# Patient Record
Sex: MALE | Race: WHITE | NOT HISPANIC OR LATINO
[De-identification: names, ages, dates, MRNs, and addresses within clinical notes are randomized per-mention and may not be internally consistent; named-entity substitution may affect disease eponyms.]

---

## 2017-01-31 PROBLEM — Z00.00 ENCOUNTER FOR PREVENTIVE HEALTH EXAMINATION: Status: ACTIVE | Noted: 2017-01-31

## 2017-03-01 ENCOUNTER — APPOINTMENT (OUTPATIENT)
Dept: CARDIOLOGY | Facility: CLINIC | Age: 82
End: 2017-03-01

## 2017-09-12 ENCOUNTER — APPOINTMENT (OUTPATIENT)
Dept: CARDIOLOGY | Facility: CLINIC | Age: 82
End: 2017-09-12
Payer: MEDICARE

## 2017-09-12 PROCEDURE — 93880 EXTRACRANIAL BILAT STUDY: CPT

## 2017-09-12 PROCEDURE — 93306 TTE W/DOPPLER COMPLETE: CPT

## 2017-09-14 PROCEDURE — 93224 XTRNL ECG REC UP TO 48 HRS: CPT

## 2017-09-19 ENCOUNTER — APPOINTMENT (OUTPATIENT)
Dept: CARDIOLOGY | Facility: CLINIC | Age: 82
End: 2017-09-19
Payer: MEDICARE

## 2017-09-19 PROCEDURE — 99214 OFFICE O/P EST MOD 30 MIN: CPT

## 2017-09-19 PROCEDURE — 93000 ELECTROCARDIOGRAM COMPLETE: CPT

## 2018-02-13 ENCOUNTER — RECORD ABSTRACTING (OUTPATIENT)
Age: 83
End: 2018-02-13

## 2018-02-14 ENCOUNTER — APPOINTMENT (OUTPATIENT)
Dept: CARDIOLOGY | Facility: CLINIC | Age: 83
End: 2018-02-14
Payer: MEDICARE

## 2018-02-14 ENCOUNTER — NON-APPOINTMENT (OUTPATIENT)
Age: 83
End: 2018-02-14

## 2018-02-14 VITALS
HEIGHT: 68 IN | SYSTOLIC BLOOD PRESSURE: 110 MMHG | DIASTOLIC BLOOD PRESSURE: 70 MMHG | HEART RATE: 54 BPM | WEIGHT: 170 LBS | OXYGEN SATURATION: 98 % | BODY MASS INDEX: 25.76 KG/M2

## 2018-02-14 DIAGNOSIS — Z86.39 PERSONAL HISTORY OF OTHER ENDOCRINE, NUTRITIONAL AND METABOLIC DISEASE: ICD-10-CM

## 2018-02-14 DIAGNOSIS — Z86.79 PERSONAL HISTORY OF OTHER DISEASES OF THE CIRCULATORY SYSTEM: ICD-10-CM

## 2018-02-14 PROCEDURE — 99214 OFFICE O/P EST MOD 30 MIN: CPT

## 2018-02-14 PROCEDURE — 93000 ELECTROCARDIOGRAM COMPLETE: CPT

## 2018-02-14 RX ORDER — METHYLPREDNISOLONE 4 MG/1
4 TABLET ORAL
Qty: 21 | Refills: 0 | Status: DISCONTINUED | COMMUNITY
Start: 2018-01-23

## 2018-02-28 ENCOUNTER — APPOINTMENT (OUTPATIENT)
Dept: CARDIOLOGY | Facility: CLINIC | Age: 83
End: 2018-02-28
Payer: MEDICARE

## 2018-02-28 VITALS
HEIGHT: 68 IN | DIASTOLIC BLOOD PRESSURE: 50 MMHG | WEIGHT: 170 LBS | BODY MASS INDEX: 25.76 KG/M2 | HEART RATE: 54 BPM | SYSTOLIC BLOOD PRESSURE: 100 MMHG

## 2018-02-28 PROCEDURE — 93306 TTE W/DOPPLER COMPLETE: CPT

## 2018-02-28 PROCEDURE — 93880 EXTRACRANIAL BILAT STUDY: CPT

## 2018-02-28 PROCEDURE — 99214 OFFICE O/P EST MOD 30 MIN: CPT

## 2018-03-05 ENCOUNTER — RX RENEWAL (OUTPATIENT)
Age: 83
End: 2018-03-05

## 2018-03-06 ENCOUNTER — APPOINTMENT (OUTPATIENT)
Dept: ELECTROPHYSIOLOGY | Facility: CLINIC | Age: 83
End: 2018-03-06

## 2018-04-04 ENCOUNTER — APPOINTMENT (OUTPATIENT)
Dept: CARDIOLOGY | Facility: CLINIC | Age: 83
End: 2018-04-04

## 2018-04-18 ENCOUNTER — APPOINTMENT (OUTPATIENT)
Age: 83
End: 2018-04-18

## 2018-06-06 ENCOUNTER — APPOINTMENT (OUTPATIENT)
Dept: CARDIOLOGY | Facility: CLINIC | Age: 83
End: 2018-06-06
Payer: MEDICARE

## 2018-06-06 ENCOUNTER — RECORD ABSTRACTING (OUTPATIENT)
Age: 83
End: 2018-06-06

## 2018-06-06 VITALS
HEART RATE: 60 BPM | DIASTOLIC BLOOD PRESSURE: 64 MMHG | SYSTOLIC BLOOD PRESSURE: 110 MMHG | HEIGHT: 68 IN | WEIGHT: 172 LBS | OXYGEN SATURATION: 97 % | BODY MASS INDEX: 26.07 KG/M2

## 2018-06-06 DIAGNOSIS — K76.89 OTHER SPECIFIED DISEASES OF LIVER: ICD-10-CM

## 2018-06-06 PROCEDURE — 93000 ELECTROCARDIOGRAM COMPLETE: CPT

## 2018-06-06 PROCEDURE — 99214 OFFICE O/P EST MOD 30 MIN: CPT

## 2018-09-11 ENCOUNTER — RECORD ABSTRACTING (OUTPATIENT)
Age: 83
End: 2018-09-11

## 2018-09-12 ENCOUNTER — APPOINTMENT (OUTPATIENT)
Dept: CARDIOLOGY | Facility: CLINIC | Age: 83
End: 2018-09-12
Payer: MEDICARE

## 2018-09-12 ENCOUNTER — NON-APPOINTMENT (OUTPATIENT)
Age: 83
End: 2018-09-12

## 2018-09-12 VITALS
HEART RATE: 60 BPM | BODY MASS INDEX: 26.98 KG/M2 | OXYGEN SATURATION: 95 % | SYSTOLIC BLOOD PRESSURE: 114 MMHG | DIASTOLIC BLOOD PRESSURE: 70 MMHG | HEIGHT: 68 IN | WEIGHT: 178 LBS

## 2018-09-12 PROCEDURE — 99214 OFFICE O/P EST MOD 30 MIN: CPT

## 2018-09-12 PROCEDURE — 93000 ELECTROCARDIOGRAM COMPLETE: CPT

## 2019-03-12 ENCOUNTER — RECORD ABSTRACTING (OUTPATIENT)
Age: 84
End: 2019-03-12

## 2019-03-12 ENCOUNTER — APPOINTMENT (OUTPATIENT)
Dept: CARDIOLOGY | Facility: CLINIC | Age: 84
End: 2019-03-12
Payer: MEDICARE

## 2019-03-12 PROCEDURE — 93880 EXTRACRANIAL BILAT STUDY: CPT

## 2019-03-12 PROCEDURE — 93306 TTE W/DOPPLER COMPLETE: CPT

## 2019-03-20 ENCOUNTER — APPOINTMENT (OUTPATIENT)
Dept: CARDIOLOGY | Facility: CLINIC | Age: 84
End: 2019-03-20
Payer: MEDICARE

## 2019-03-20 ENCOUNTER — MEDICATION RENEWAL (OUTPATIENT)
Age: 84
End: 2019-03-20

## 2019-03-20 VITALS
BODY MASS INDEX: 26.07 KG/M2 | DIASTOLIC BLOOD PRESSURE: 64 MMHG | WEIGHT: 172 LBS | HEART RATE: 62 BPM | HEIGHT: 68 IN | OXYGEN SATURATION: 97 % | SYSTOLIC BLOOD PRESSURE: 110 MMHG

## 2019-03-20 PROCEDURE — 93000 ELECTROCARDIOGRAM COMPLETE: CPT

## 2019-03-20 PROCEDURE — 99214 OFFICE O/P EST MOD 30 MIN: CPT

## 2019-03-20 NOTE — PHYSICAL EXAM
[Normal Appearance] : normal appearance [Eyelids - No Xanthelasma] : the eyelids demonstrated no xanthelasmas [No Oral Pallor] : no oral pallor [No Jugular Venous Alba A Waves] : no jugular venous alba A waves [Normal Jugular Venous A Waves Present] : normal jugular venous A waves present [Normal Jugular Venous V Waves Present] : normal jugular venous V waves present [Respiration, Rhythm And Depth] : normal respiratory rhythm and effort [Heart Sounds] : normal S1 and S2 [Heart Rate And Rhythm] : heart rate and rhythm were normal [Bowel Sounds] : normal bowel sounds [Abdomen Soft] : soft [Petechial Hemorrhages (___cm)] : no petechial hemorrhages [Abnormal Walk] : normal gait [] : no rash [Oriented To Time, Place, And Person] : oriented to person, place, and time

## 2019-03-20 NOTE — PHYSICAL EXAM
[Normal Appearance] : normal appearance [Eyelids - No Xanthelasma] : the eyelids demonstrated no xanthelasmas [No Oral Pallor] : no oral pallor [No Jugular Venous Alba A Waves] : no jugular venous alba A waves [Normal Jugular Venous V Waves Present] : normal jugular venous V waves present [Normal Jugular Venous A Waves Present] : normal jugular venous A waves present [Respiration, Rhythm And Depth] : normal respiratory rhythm and effort [Heart Rate And Rhythm] : heart rate and rhythm were normal [Heart Sounds] : normal S1 and S2 [Bowel Sounds] : normal bowel sounds [Abdomen Soft] : soft [Abnormal Walk] : normal gait [Petechial Hemorrhages (___cm)] : no petechial hemorrhages [] : no rash [Oriented To Time, Place, And Person] : oriented to person, place, and time

## 2019-03-22 NOTE — HISTORY OF PRESENT ILLNESS
[FreeTextEntry1] : I saw Rudolph today.  He is feeling better.  He had pain in the right shoulder, and there were at least two points where the pain was severe, and you had given him the injection at the site and his pain is much better.  He is going to call you to see if he should wear a sling.  He is otherwise feeling well.  \par He had minimal imbalance and TIA-like symptoms.  No orthopnea or PND.  No fever or chills.  \par He has mitral and tricuspid valve regurgitation.  We will get an echocardiogram sometime next spring and also check his carotids to make sure there is no worsening of the severe nonobstructive disease.  He is going to have blood work with you sometime in the beginning of the year.  \par We should periodically check his plasma potassium, magnesium, and thyroid profile.  \par He is very aware of low-cholesterol balanced diet.  He will call us if there are any new symptoms.  His cardiogram was sinus rhythm and within normal limits.\par \par \par On calling the hospital, there were no EKGs that showed atrial flutter or fibrillation.  The patient states that he was told that cardiology had not seen the patient and subsequently the cardiologist had read the EKG as sinus rhythm with PACs.  We discussed possible anticoagulation.  The patient irefuses to go on any medicines such as Eliquis, Coumadin, etc.  \par The patient is on low-dose aspirin and Plavix already.  So, we will wait till sinus rhythm and symptoms. He bruises very easily. We will continue present medical regimen.  So if he has any palpitations or any similar symptoms, he will call us or come to the emergency room.  We will see him in early fall.\par \par SAVE NOTE:\par  He has been doing well and suddenly on February 10, 2018, he states that he suddenly felt slightly dizzy, weak, and generalized weakness.  So he called 911.  He went to the hospital by ambulance.  In the ER, he did have atrial flutter fibrillation according to the discharge summary and he was started on metoprolol and Apixaban and placed on observation.  He had TIA without any residual with right-sided weakness seven years ago.  He has been on baby aspirin and Plavix since then.  He was monitored overnight after presenting with dizziness and found to have new onset atrial flutter fibrillation.  He had no further episodes of anything that looked like atrial fibrillation and in retrospect the initial EKG it had been read as atrial fibrillation with rapid ventricular response was read by cardiologist as sinus rhythm with PACs.  There was no confirmed EKG that shows atrial fibrillation and is not clear whether the patient truly had it.  He did certainly have some PACs.  He might have had some runs of atrial tachycardia in the emergency room.  Cardiology had not seen the patient.  \par He has history of hypertension and hypercholesterolemia for which he is on low dose aspirin, Plavix, and Crestor 2.5 mg once a day.  The chest x-ray was clear bilaterally.  The costophrenic angles were clear and the pulmonary structures were normal.  \par He had a CAT scan of the brain that showed no cerebral intracerebral hemorrhage or shift.  No extra axial calcification.  No mass effect.  No midline shift.  Mild to moderate atrophy.  Old lacunar infarcts in the left internal capsule.  \par On reviewing the EKG, there is no obvious atrial fibrillation.  There are sequential PACs and on the information reviewed, I do not see any fast irregular heartbeat.  They will also have and see electrophysiologist Dr. Silverman.  The patient states he had a CAT scan but we get Doppler carotids and repeat the echocardiogram.  He has no shortness of breath and no chest pain.  \par He has history of CVA as mentioned earlier seven years ago without residual.  He states as soon as the police after first responding gave him oxygen, he felt better and there was no residual issue.  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 Chase at [place].  \par He on echocardiogram done recently had minimum mitral and mild tricuspid valve regurgitation and mild pulmonic regurgitation, normal LVEF, and no CHF. 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  He was checked for tick borne diseases etc. in the fall and they were all negative.  \par He denies any chest pain and any shortness of breath.  He feels like before quite asymptomatic.  He will call us if there are any new symptoms. \par  He is aware of low cholesterol and low salt balanced diet and continued regular walking exercise program.

## 2019-03-22 NOTE — DISCUSSION/SUMMARY
[FreeTextEntry1] : \par I saw Rudolph today.  He is feeling better.  He had pain in the right shoulder, and there were at least two points where the pain was severe, and you had given him the injection at the site and his pain is much better.  He is going to call you to see if he should wear a sling.  He is otherwise feeling well.  \par He had minimal imbalance and TIA-like symptoms.  No orthopnea or PND.  No fever or chills.  \par He has mitral and tricuspid valve regurgitation.  We will get an echocardiogram sometime next spring and also check his carotids to make sure there is no worsening of the severe nonobstructive disease.  He is going to have blood work with you sometime in the beginning of the year.  \par We should periodically check his plasma potassium, magnesium, and thyroid profile.  \par He is very aware of low-cholesterol balanced diet.  He will call us if there are any new symptoms.  His cardiogram was sinus rhythm and within normal limits.\par \par \par On calling the hospital, there were no EKGs that showed atrial flutter or fibrillation.  The patient states that he was told that cardiology had not seen the patient and subsequently the cardiologist had read the EKG as sinus rhythm with PACs.  We discussed possible anticoagulation.  The patient irefuses to go on any medicines such as Eliquis, Coumadin, etc.  \par The patient is on low-dose aspirin and Plavix already.  So, we will wait till sinus rhythm and symptoms. He bruises very easily. We will continue present medical regimen.  So if he has any palpitations or any similar symptoms, he will call us or come to the emergency room.  We will see him in early fall.\par \par SAVE NOTE:\par  He has been doing well and suddenly on February 10, 2018, he states that he suddenly felt slightly dizzy, weak, and generalized weakness.  So he called 911.  He went to the hospital by ambulance.  In the ER, he did have atrial flutter fibrillation according to the discharge summary and he was started on metoprolol and Apixaban and placed on observation.  He had TIA without any residual with right-sided weakness seven years ago.  He has been on baby aspirin and Plavix since then.  He was monitored overnight after presenting with dizziness and found to have new onset atrial flutter fibrillation.  He had no further episodes of anything that looked like atrial fibrillation and in retrospect the initial EKG it had been read as atrial fibrillation with rapid ventricular response was read by cardiologist as sinus rhythm with PACs.  There was no confirmed EKG that shows atrial fibrillation and is not clear whether the patient truly had it.  He did certainly have some PACs.  He might have had some runs of atrial tachycardia in the emergency room.  Cardiology had not seen the patient.  \par He has history of hypertension and hypercholesterolemia for which he is on low dose aspirin, Plavix, and Crestor 2.5 mg once a day.  The chest x-ray was clear bilaterally.  The costophrenic angles were clear and the pulmonary structures were normal.  \par He had a CAT scan of the brain that showed no cerebral intracerebral hemorrhage or shift.  No extra axial calcification.  No mass effect.  No midline shift.  Mild to moderate atrophy.  Old lacunar infarcts in the left internal capsule.  \par On reviewing the EKG, there is no obvious atrial fibrillation.  There are sequential PACs and on the information reviewed, I do not see any fast irregular heartbeat.  They will also have and see electrophysiologist Dr. Silverman.  The patient states he had a CAT scan but we get Doppler carotids and repeat the echocardiogram.  He has no shortness of breath and no chest pain.  \par He has history of CVA as mentioned earlier seven years ago without residual.  He states as soon as the police after first responding gave him oxygen, he felt better and there was no residual issue.  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 Chase at [place].  \par He on echocardiogram done recently had minimum mitral and mild tricuspid valve regurgitation and mild pulmonic regurgitation, normal LVEF, and no CHF. 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  He was checked for tick borne diseases etc. in the fall and they were all negative.  \par He denies any chest pain and any shortness of breath.  He feels like before quite asymptomatic.  He will call us if there are any new symptoms. \par  He is aware of low cholesterol and low salt balanced diet and continued regular walking exercise program.

## 2019-03-22 NOTE — ASSESSMENT
[FreeTextEntry1] : Afib: No definitive evidence of atrial fibrillation. PT elects to cancel EP appt. Will remain on ASA and Plavix and notify me of any changes in symptoms. No neurological symptoms at present. He has prior history of TIA.\par \par HTN: well controlled.\par \par PVD: mild carotid atherosclerosis by preliminary report.\par \par Echocardiogram reveals normal LV function and wall motion. There was a hepatic cyst noted. Rx for dedicated US abdomen was given.\par \par Recommend follow up in 2 months. HM can be repeated at that time.\par \par

## 2019-03-22 NOTE — HISTORY OF PRESENT ILLNESS
[FreeTextEntry1] : I saw Rudolph today.  He is feeling better.  He had pain in the right shoulder, and there were at least two points where the pain was severe, and you had given him the injection at the site and his pain is much better.  He is going to call you to see if he should wear a sling.  He is otherwise feeling well.  \par He had minimal imbalance and TIA-like symptoms.  No orthopnea or PND.  No fever or chills.  \par He has mitral and tricuspid valve regurgitation.  We will get an echocardiogram sometime next spring and also check his carotids to make sure there is no worsening of the severe nonobstructive disease.  He is going to have blood work with you sometime in the beginning of the year.  \par We should periodically check his plasma potassium, magnesium, and thyroid profile.  \par He is very aware of low-cholesterol balanced diet.  He will call us if there are any new symptoms.  His cardiogram was sinus rhythm and within normal limits.\par \par \par On calling the hospital, there were no EKGs that showed atrial flutter or fibrillation.  The patient states that he was told that cardiology had not seen the patient and subsequently the cardiologist had read the EKG as sinus rhythm with PACs.  We discussed possible anticoagulation.  The patient irefuses to go on any medicines such as Eliquis, Coumadin, etc.  \par The patient is on low-dose aspirin and Plavix already.  So, we will wait till sinus rhythm and symptoms. He bruises very easily. We will continue present medical regimen.  So if he has any palpitations or any similar symptoms, he will call us or come to the emergency room.  We will see him in early fall.\par \par SAVE NOTE:\par  He has been doing well and suddenly on February 10, 2018, he states that he suddenly felt slightly dizzy, weak, and generalized weakness.  So he called 911.  He went to the hospital by ambulance.  In the ER, he did have atrial flutter fibrillation according to the discharge summary and he was started on metoprolol and Apixaban and placed on observation.  He had TIA without any residual with right-sided weakness seven years ago.  He has been on baby aspirin and Plavix since then.  He was monitored overnight after presenting with dizziness and found to have new onset atrial flutter fibrillation.  He had no further episodes of anything that looked like atrial fibrillation and in retrospect the initial EKG it had been read as atrial fibrillation with rapid ventricular response was read by cardiologist as sinus rhythm with PACs.  There was no confirmed EKG that shows atrial fibrillation and is not clear whether the patient truly had it.  He did certainly have some PACs.  He might have had some runs of atrial tachycardia in the emergency room.  Cardiology had not seen the patient.  \par He has history of hypertension and hypercholesterolemia for which he is on low dose aspirin, Plavix, and Crestor 2.5 mg once a day.  The chest x-ray was clear bilaterally.  The costophrenic angles were clear and the pulmonary structures were normal.  \par He had a CAT scan of the brain that showed no cerebral intracerebral hemorrhage or shift.  No extra axial calcification.  No mass effect.  No midline shift.  Mild to moderate atrophy.  Old lacunar infarcts in the left internal capsule.  \par On reviewing the EKG, there is no obvious atrial fibrillation.  There are sequential PACs and on the information reviewed, I do not see any fast irregular heartbeat.  They will also have and see electrophysiologist Dr. Silverman.  The patient states he had a CAT scan but we get Doppler carotids and repeat the echocardiogram.  He has no shortness of breath and no chest pain.  \par He has history of CVA as mentioned earlier seven years ago without residual.  He states as soon as the police after first responding gave him oxygen, he felt better and there was no residual issue.  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 Chase at [place].  \par He on echocardiogram done recently had minimum mitral and mild tricuspid valve regurgitation and mild pulmonic regurgitation, normal LVEF, and no CHF. 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  He was checked for tick borne diseases etc. in the fall and they were all negative.  \par He denies any chest pain and any shortness of breath.  He feels like before quite asymptomatic.  He will call us if there are any new symptoms. \par  He is aware of low cholesterol and low salt balanced diet and continued regular walking exercise program.

## 2019-03-22 NOTE — DISCUSSION/SUMMARY
[FreeTextEntry1] : \par I saw Rudolph today.  He is feeling better.  He had pain in the right shoulder, and there were at least two points where the pain was severe, and you had given him the injection at the site and his pain is much better.  He is going to call you to see if he should wear a sling.  He is otherwise feeling well.  \par He had minimal imbalance and TIA-like symptoms.  No orthopnea or PND.  No fever or chills.  \par He has mitral and tricuspid valve regurgitation.  We will get an echocardiogram sometime next spring and also check his carotids to make sure there is no worsening of the severe nonobstructive disease.  He is going to have blood work with you sometime in the beginning of the year.  \par We should periodically check his plasma potassium, magnesium, and thyroid profile.  \par He is very aware of low-cholesterol balanced diet.  He will call us if there are any new symptoms.  His cardiogram was sinus rhythm and within normal limits.\par \par \par On calling the hospital, there were no EKGs that showed atrial flutter or fibrillation.  The patient states that he was told that cardiology had not seen the patient and subsequently the cardiologist had read the EKG as sinus rhythm with PACs.  We discussed possible anticoagulation.  The patient irefuses to go on any medicines such as Eliquis, Coumadin, etc.  \par The patient is on low-dose aspirin and Plavix already.  So, we will wait till sinus rhythm and symptoms. He bruises very easily. We will continue present medical regimen.  So if he has any palpitations or any similar symptoms, he will call us or come to the emergency room.  We will see him in early fall.\par \par SAVE NOTE:\par  He has been doing well and suddenly on February 10, 2018, he states that he suddenly felt slightly dizzy, weak, and generalized weakness.  So he called 911.  He went to the hospital by ambulance.  In the ER, he did have atrial flutter fibrillation according to the discharge summary and he was started on metoprolol and Apixaban and placed on observation.  He had TIA without any residual with right-sided weakness seven years ago.  He has been on baby aspirin and Plavix since then.  He was monitored overnight after presenting with dizziness and found to have new onset atrial flutter fibrillation.  He had no further episodes of anything that looked like atrial fibrillation and in retrospect the initial EKG it had been read as atrial fibrillation with rapid ventricular response was read by cardiologist as sinus rhythm with PACs.  There was no confirmed EKG that shows atrial fibrillation and is not clear whether the patient truly had it.  He did certainly have some PACs.  He might have had some runs of atrial tachycardia in the emergency room.  Cardiology had not seen the patient.  \par He has history of hypertension and hypercholesterolemia for which he is on low dose aspirin, Plavix, and Crestor 2.5 mg once a day.  The chest x-ray was clear bilaterally.  The costophrenic angles were clear and the pulmonary structures were normal.  \par He had a CAT scan of the brain that showed no cerebral intracerebral hemorrhage or shift.  No extra axial calcification.  No mass effect.  No midline shift.  Mild to moderate atrophy.  Old lacunar infarcts in the left internal capsule.  \par On reviewing the EKG, there is no obvious atrial fibrillation.  There are sequential PACs and on the information reviewed, I do not see any fast irregular heartbeat.  They will also have and see electrophysiologist Dr. Silverman.  The patient states he had a CAT scan but we get Doppler carotids and repeat the echocardiogram.  He has no shortness of breath and no chest pain.  \par He has history of CVA as mentioned earlier seven years ago without residual.  He states as soon as the police after first responding gave him oxygen, he felt better and there was no residual issue.  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 Chase at [place].  \par He on echocardiogram done recently had minimum mitral and mild tricuspid valve regurgitation and mild pulmonic regurgitation, normal LVEF, and no CHF. 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  He was checked for tick borne diseases etc. in the fall and they were all negative.  \par He denies any chest pain and any shortness of breath.  He feels like before quite asymptomatic.  He will call us if there are any new symptoms. \par  He is aware of low cholesterol and low salt balanced diet and continued regular walking exercise program.

## 2019-06-19 ENCOUNTER — APPOINTMENT (OUTPATIENT)
Dept: CARDIOLOGY | Facility: CLINIC | Age: 84
End: 2019-06-19

## 2019-08-01 ENCOUNTER — APPOINTMENT (OUTPATIENT)
Dept: CARDIOLOGY | Facility: CLINIC | Age: 84
End: 2019-08-01
Payer: MEDICARE

## 2019-08-01 VITALS
HEIGHT: 68 IN | BODY MASS INDEX: 25.76 KG/M2 | WEIGHT: 170 LBS | HEART RATE: 78 BPM | DIASTOLIC BLOOD PRESSURE: 68 MMHG | OXYGEN SATURATION: 97 % | SYSTOLIC BLOOD PRESSURE: 128 MMHG

## 2019-08-01 PROCEDURE — 99214 OFFICE O/P EST MOD 30 MIN: CPT

## 2019-08-01 RX ORDER — METOPROLOL SUCCINATE 25 MG/1
25 TABLET, EXTENDED RELEASE ORAL DAILY
Qty: 45 | Refills: 0 | Status: ACTIVE | COMMUNITY
Start: 2019-08-01 | End: 1900-01-01

## 2019-08-01 RX ORDER — CLOPIDOGREL BISULFATE 75 MG/1
75 TABLET, FILM COATED ORAL
Refills: 0 | Status: DISCONTINUED | COMMUNITY
Start: 2017-10-09 | End: 2019-08-01

## 2019-08-01 RX ORDER — METOPROLOL TARTRATE 25 MG/1
25 TABLET, FILM COATED ORAL
Qty: 180 | Refills: 3 | Status: DISCONTINUED | COMMUNITY
Start: 2018-02-11 | End: 2019-08-01

## 2019-08-01 RX ORDER — METOPROLOL TARTRATE 25 MG/1
25 TABLET, FILM COATED ORAL
Refills: 0 | Status: DISCONTINUED | COMMUNITY
End: 2019-08-01

## 2019-08-01 RX ORDER — ASPIRIN 81 MG
81 TABLET, DELAYED RELEASE (ENTERIC COATED) ORAL
Refills: 0 | Status: DISCONTINUED | COMMUNITY
End: 2019-08-01

## 2019-08-01 NOTE — PHYSICAL EXAM
[General Appearance - Well Developed] : well developed [Normal Appearance] : normal appearance [Well Groomed] : well groomed [General Appearance - Well Nourished] : well nourished [General Appearance - In No Acute Distress] : no acute distress [Normal Conjunctiva] : the conjunctiva exhibited no abnormalities [Eyelids - No Xanthelasma] : the eyelids demonstrated no xanthelasmas [No Oral Pallor] : no oral pallor [No Oral Cyanosis] : no oral cyanosis [FreeTextEntry1] : No JVD, deferred auscultation of carotid arteries. [Respiration, Rhythm And Depth] : normal respiratory rhythm and effort [Exaggerated Use Of Accessory Muscles For Inspiration] : no accessory muscle use [Auscultation Breath Sounds / Voice Sounds] : lungs were clear to auscultation bilaterally [Heart Rate And Rhythm] : heart rate and rhythm were normal [Heart Sounds] : normal S1 and S2 [Murmurs] : no murmurs present [Edema] : no peripheral edema present [Abnormal Walk] : normal gait [Gait - Sufficient For Exercise Testing] : the gait was sufficient for exercise testing [Nail Clubbing] : no clubbing of the fingernails [Cyanosis, Localized] : no localized cyanosis [Skin Color & Pigmentation] : normal skin color and pigmentation [] : no rash [Impaired Insight] : insight and judgment were intact [Affect] : the affect was normal [Memory Recent] : recent memory was not impaired

## 2019-08-01 NOTE — REVIEW OF SYSTEMS
[Shortness Of Breath] : no shortness of breath [Dyspnea on exertion] : not dyspnea during exertion [Chest  Pressure] : no chest pressure [Chest Pain] : no chest pain [Lower Ext Edema] : no extremity edema [Palpitations] : no palpitations [Joint Pain] : joint pain [Joint Stiffness] : joint stiffness [Easy Bleeding] : no tendency for easy bleeding [Easy Bruising] : no tendency for easy bruising [Negative] : Endocrine

## 2019-08-01 NOTE — REASON FOR VISIT
[Follow-Up - Clinic] : a clinic follow-up of [Carotid Artery Stenosis] : carotid stenosis [Medication Management] : Medication management

## 2019-08-01 NOTE — DISCUSSION/SUMMARY
[FreeTextEntry1] : 1. Carotid Artery Disease: carotid duplex performed 3/12/2019, revealed MERCED velocities suggestive of 50-69% stenosis, however vessel was tortuous as well. I recommend continuing statin therapy. Recommend d/c of Plavix two days a week. Recommend Aspirin 81mg daily.\par \par 2. HLD: continue statin therapy.\par \par 3. HTN: continue Toprol XL 25mg 0.5 tab daily.\par \par Follow up in March for repeat echo and carotid duplex. \par \par

## 2020-03-09 ENCOUNTER — APPOINTMENT (OUTPATIENT)
Dept: CARDIOLOGY | Facility: CLINIC | Age: 85
End: 2020-03-09
Payer: MEDICARE

## 2020-03-09 ENCOUNTER — NON-APPOINTMENT (OUTPATIENT)
Age: 85
End: 2020-03-09

## 2020-03-09 VITALS
OXYGEN SATURATION: 97 % | BODY MASS INDEX: 25.76 KG/M2 | SYSTOLIC BLOOD PRESSURE: 118 MMHG | WEIGHT: 170 LBS | HEART RATE: 56 BPM | DIASTOLIC BLOOD PRESSURE: 64 MMHG | HEIGHT: 68 IN

## 2020-03-09 PROCEDURE — 93306 TTE W/DOPPLER COMPLETE: CPT

## 2020-03-09 PROCEDURE — 93000 ELECTROCARDIOGRAM COMPLETE: CPT

## 2020-03-09 PROCEDURE — 99214 OFFICE O/P EST MOD 30 MIN: CPT | Mod: 25

## 2020-03-09 PROCEDURE — 93880 EXTRACRANIAL BILAT STUDY: CPT

## 2020-03-09 RX ORDER — LEVOTHYROXINE SODIUM 0.03 MG/1
25 TABLET ORAL DAILY
Refills: 0 | Status: ACTIVE | COMMUNITY
Start: 2020-03-09

## 2020-03-09 NOTE — DISCUSSION/SUMMARY
[FreeTextEntry1] : 1. Carotid Artery Disease: carotid duplex performed 3/12/2019, revealed MERCED velocities suggestive of 50-69% stenosis, however vessel was tortuous as well. Patient underwent follow up carotid duplex today, 03/09/2020. Showed about 50% bilateral carotid artery disease.  I recommend continuing statin therapy.  Recommend Aspirin 81mg daily.\par \par 2. HLD: continue statin therapy.\par \par 3. HTN: continue Toprol XL 25mg 0.5 tab daily.\par \par 4. Mild Valvular Disease: patient underwent echocardiogram today, 03/09/2020. No significant changes. Periodic echo surveillance. \par \par Follow up on 6 months. \par \par

## 2020-03-09 NOTE — PHYSICAL EXAM
[General Appearance - Well Developed] : well developed [Normal Appearance] : normal appearance [Well Groomed] : well groomed [General Appearance - Well Nourished] : well nourished [General Appearance - In No Acute Distress] : no acute distress [Normal Conjunctiva] : the conjunctiva exhibited no abnormalities [Eyelids - No Xanthelasma] : the eyelids demonstrated no xanthelasmas [No Oral Pallor] : no oral pallor [No Oral Cyanosis] : no oral cyanosis [Respiration, Rhythm And Depth] : normal respiratory rhythm and effort [Exaggerated Use Of Accessory Muscles For Inspiration] : no accessory muscle use [Auscultation Breath Sounds / Voice Sounds] : lungs were clear to auscultation bilaterally [Heart Rate And Rhythm] : heart rate and rhythm were normal [Heart Sounds] : normal S1 and S2 [Murmurs] : no murmurs present [Edema] : no peripheral edema present [Abnormal Walk] : normal gait [Gait - Sufficient For Exercise Testing] : the gait was sufficient for exercise testing [Nail Clubbing] : no clubbing of the fingernails [Cyanosis, Localized] : no localized cyanosis [Skin Color & Pigmentation] : normal skin color and pigmentation [] : no rash [Impaired Insight] : insight and judgment were intact [Affect] : the affect was normal [Memory Recent] : recent memory was not impaired [FreeTextEntry1] : No JVD, deferred auscultation of carotid arteries.

## 2020-03-09 NOTE — HISTORY OF PRESENT ILLNESS
[FreeTextEntry1] : This is an 84 year old male with history of HLD, carotid artery disease, HTN, TIA presents for follow up visit. He is uncertain on how he should be taking some of his medications. He is taking Aspirin 81mg two times a week and Plavix 75mg 2 times a week. He is taking rosuvastatin 5mg 0.5tab per night and Toprol XL 25mg 0.5 tab daily. He feels great. He has no chest pain, SOB, or palpitations.

## 2020-03-09 NOTE — REVIEW OF SYSTEMS
[Joint Pain] : joint pain [Joint Stiffness] : joint stiffness [Negative] : Endocrine [Shortness Of Breath] : no shortness of breath [Dyspnea on exertion] : not dyspnea during exertion [Chest  Pressure] : no chest pressure [Chest Pain] : no chest pain [Lower Ext Edema] : no extremity edema [Palpitations] : no palpitations [Easy Bleeding] : no tendency for easy bleeding [Easy Bruising] : no tendency for easy bruising

## 2020-09-16 ENCOUNTER — APPOINTMENT (OUTPATIENT)
Dept: CARDIOLOGY | Facility: CLINIC | Age: 85
End: 2020-09-16
Payer: MEDICARE

## 2020-09-16 ENCOUNTER — NON-APPOINTMENT (OUTPATIENT)
Age: 85
End: 2020-09-16

## 2020-09-16 VITALS
HEIGHT: 68 IN | HEART RATE: 60 BPM | DIASTOLIC BLOOD PRESSURE: 70 MMHG | OXYGEN SATURATION: 96 % | SYSTOLIC BLOOD PRESSURE: 126 MMHG | BODY MASS INDEX: 23.04 KG/M2 | WEIGHT: 152 LBS | TEMPERATURE: 98 F

## 2020-09-16 PROCEDURE — 99215 OFFICE O/P EST HI 40 MIN: CPT | Mod: 25

## 2020-09-16 PROCEDURE — 93000 ELECTROCARDIOGRAM COMPLETE: CPT

## 2020-09-16 NOTE — HISTORY OF PRESENT ILLNESS
[FreeTextEntry1] : Historical Perspective:\par This is an 85 year old male with history of HLD, carotid artery disease, HTN, TIA presents for follow up visit. He is uncertain on how he should be taking some of his medications. He is taking Aspirin 81mg two times a week and Plavix 75mg 2 times a week. He is taking rosuvastatin 5mg 0.5tab per night and Toprol XL 25mg 0.5 tab daily. \par \par Current Health Status:\par Patient with no chest pain, SOB, or palpitations. No hospitalizations since seeing me last. Remains compliant with his medications and reports no adverse effects.\par

## 2020-09-16 NOTE — REVIEW OF SYSTEMS
[Joint Pain] : joint pain [Joint Stiffness] : joint stiffness [Negative] : Endocrine [Shortness Of Breath] : no shortness of breath [Dyspnea on exertion] : not dyspnea during exertion [Chest Pain] : no chest pain [Lower Ext Edema] : no extremity edema [Chest  Pressure] : no chest pressure [Easy Bleeding] : no tendency for easy bleeding [Palpitations] : no palpitations [Easy Bruising] : no tendency for easy bruising

## 2020-09-16 NOTE — PHYSICAL EXAM
[Well Groomed] : well groomed [Normal Appearance] : normal appearance [General Appearance - Well Nourished] : well nourished [General Appearance - Well Developed] : well developed [Normal Conjunctiva] : the conjunctiva exhibited no abnormalities [General Appearance - In No Acute Distress] : no acute distress [Eyelids - No Xanthelasma] : the eyelids demonstrated no xanthelasmas [No Oral Cyanosis] : no oral cyanosis [No Oral Pallor] : no oral pallor [Auscultation Breath Sounds / Voice Sounds] : lungs were clear to auscultation bilaterally [Exaggerated Use Of Accessory Muscles For Inspiration] : no accessory muscle use [Respiration, Rhythm And Depth] : normal respiratory rhythm and effort [Murmurs] : no murmurs present [Heart Sounds] : normal S1 and S2 [Heart Rate And Rhythm] : heart rate and rhythm were normal [Gait - Sufficient For Exercise Testing] : the gait was sufficient for exercise testing [Abnormal Walk] : normal gait [Edema] : no peripheral edema present [Nail Clubbing] : no clubbing of the fingernails [Cyanosis, Localized] : no localized cyanosis [Skin Color & Pigmentation] : normal skin color and pigmentation [] : no rash [Impaired Insight] : insight and judgment were intact [Memory Recent] : recent memory was not impaired [Affect] : the affect was normal [FreeTextEntry1] : No JVD, deferred auscultation of carotid arteries.

## 2020-09-16 NOTE — DISCUSSION/SUMMARY
[FreeTextEntry1] : 1. Carotid Artery Disease/Hx of TIA: carotid duplex performed 3/12/2019, revealed MERCED velocities suggestive of 50-69% stenosis, however vessel was tortuous as well. Patient underwent follow up carotid duplex, 03/09/2020. Showed about 50% bilateral internal carotid artery disease.  I recommend continuing statin therapy.  Recommend Aspirin 81mg daily.\par \par 2. HLD: continue statin therapy.\par \par 3. HTN: continue Toprol XL 25mg 0.5 tab daily (high risk medication with no signs of toxicity).\par \par 4. Mild Valvular Disease: patient underwent echocardiogram today, 03/09/2020. No significant changes. Periodic echo surveillance. \par \par Follow up on 6 months. \par \par

## 2020-09-16 NOTE — REASON FOR VISIT
[Carotid Artery Stenosis] : carotid stenosis [Medication Management] : Medication management [Follow-Up - Clinic] : a clinic follow-up of

## 2021-03-16 ENCOUNTER — APPOINTMENT (OUTPATIENT)
Dept: CARDIOLOGY | Facility: CLINIC | Age: 86
End: 2021-03-16

## 2021-04-07 ENCOUNTER — NON-APPOINTMENT (OUTPATIENT)
Age: 86
End: 2021-04-07

## 2021-04-07 ENCOUNTER — APPOINTMENT (OUTPATIENT)
Dept: CARDIOLOGY | Facility: CLINIC | Age: 86
End: 2021-04-07
Payer: MEDICARE

## 2021-04-07 VITALS
OXYGEN SATURATION: 98 % | WEIGHT: 170 LBS | HEART RATE: 57 BPM | TEMPERATURE: 97.1 F | DIASTOLIC BLOOD PRESSURE: 60 MMHG | HEIGHT: 68 IN | SYSTOLIC BLOOD PRESSURE: 116 MMHG | BODY MASS INDEX: 25.76 KG/M2

## 2021-04-07 PROCEDURE — 93000 ELECTROCARDIOGRAM COMPLETE: CPT

## 2021-04-07 PROCEDURE — 99215 OFFICE O/P EST HI 40 MIN: CPT | Mod: 25

## 2021-04-07 NOTE — DISCUSSION/SUMMARY
[FreeTextEntry1] : 1. Carotid Artery Disease/Hx of TIA: carotid duplex performed 3/12/2019, revealed MERCED velocities suggestive of 50-69% stenosis, however vessel was tortuous as well. Patient underwent follow up carotid duplex, 03/09/2020. Showed about 50% bilateral internal carotid artery disease.  I recommend continuing statin therapy.  Recommend Aspirin 81mg daily. Periodic carotid duplex surveillance.\par \par 2. HLD: continue statin therapy.\par \par 3. HTN: continue Toprol XL 25mg 0.5 tab daily (high risk medication with no signs of toxicity).\par \par 4. Mild Valvular Disease: patient underwent echocardiogram today, 03/09/2020. No significant changes. Periodic echo surveillance. \par \par Follow up on 6 months. \par \par

## 2021-04-09 ENCOUNTER — APPOINTMENT (OUTPATIENT)
Dept: CARDIOLOGY | Facility: CLINIC | Age: 86
End: 2021-04-09
Payer: MEDICARE

## 2021-04-09 PROCEDURE — 93880 EXTRACRANIAL BILAT STUDY: CPT

## 2021-10-08 ENCOUNTER — APPOINTMENT (OUTPATIENT)
Dept: CARDIOLOGY | Facility: CLINIC | Age: 86
End: 2021-10-08
Payer: MEDICARE

## 2021-10-08 ENCOUNTER — NON-APPOINTMENT (OUTPATIENT)
Age: 86
End: 2021-10-08

## 2021-10-08 VITALS
SYSTOLIC BLOOD PRESSURE: 136 MMHG | OXYGEN SATURATION: 98 % | HEART RATE: 84 BPM | DIASTOLIC BLOOD PRESSURE: 72 MMHG | BODY MASS INDEX: 26.07 KG/M2 | TEMPERATURE: 97.3 F | WEIGHT: 172 LBS | HEIGHT: 68 IN

## 2021-10-08 PROCEDURE — 93000 ELECTROCARDIOGRAM COMPLETE: CPT

## 2021-10-08 PROCEDURE — 99215 OFFICE O/P EST HI 40 MIN: CPT | Mod: 25

## 2021-10-08 NOTE — CARDIOLOGY SUMMARY
[de-identified] : 10/8/2021, NSR [de-identified] : 03/09/2020, Mild MR, moderately dilated LA LVEF 55%. [de-identified] : 4/9/2021, Carotid Duplex, non-obstructive disease

## 2021-10-08 NOTE — HISTORY OF PRESENT ILLNESS
[FreeTextEntry1] : Historical Perspective:\par This is an 86 year old male with history of HLD, carotid artery disease, HTN, TIA presents for follow up visit. He is uncertain on how he should be taking some of his medications. He is taking Aspirin 81mg two times a week and Plavix 75mg 2 times a week. He is taking rosuvastatin 5mg 0.5tab per night and Toprol XL 25mg 0.5 tab daily. \par \par Current Health Status:\par Patient with no chest pain, SOB, or palpitations. No hospitalizations since seeing me last. Remains compliant with his medications and reports no adverse effects.

## 2021-10-08 NOTE — PHYSICAL EXAM
[Normal] : no edema, no cyanosis, no clubbing, no varicosities [Moves all extremities] : moves all extremities [de-identified] : No JVD, no carotid artery bruits auscultated bilaterally [de-identified] : slow, shuffling gait

## 2021-10-08 NOTE — DISCUSSION/SUMMARY
[FreeTextEntry1] : 1. Carotid Artery Disease/Hx of TIA: carotid duplex performed 3/12/2019, revealed MERCED velocities suggestive of 50-69% stenosis, however vessel was tortuous as well. Patient underwent follow up carotid duplex, 03/09/2020. Showed about 50% bilateral internal carotid artery disease. I recommend continuing statin therapy. Recommend Aspirin 81mg daily. Periodic carotid duplex surveillance.\par \par 2. HLD: continue statin therapy.\par \par 3. HTN: continue Toprol XL 25mg 0.5 tab daily (high risk medication with no signs of toxicity).\par \par 4. Mild Valvular Disease: patient underwent echocardiogram today, 03/09/2020. No significant changes. Periodic echo surveillance. \par \par Follow up on 6 months.

## 2022-04-11 ENCOUNTER — APPOINTMENT (OUTPATIENT)
Dept: CARDIOLOGY | Facility: CLINIC | Age: 87
End: 2022-04-11
Payer: MEDICARE

## 2022-04-11 PROCEDURE — 93306 TTE W/DOPPLER COMPLETE: CPT

## 2022-04-11 PROCEDURE — 93880 EXTRACRANIAL BILAT STUDY: CPT

## 2022-04-19 ENCOUNTER — APPOINTMENT (OUTPATIENT)
Dept: CARDIOLOGY | Facility: CLINIC | Age: 87
End: 2022-04-19
Payer: MEDICARE

## 2022-04-19 ENCOUNTER — NON-APPOINTMENT (OUTPATIENT)
Age: 87
End: 2022-04-19

## 2022-04-19 VITALS
HEART RATE: 77 BPM | HEIGHT: 68 IN | DIASTOLIC BLOOD PRESSURE: 76 MMHG | WEIGHT: 170 LBS | SYSTOLIC BLOOD PRESSURE: 136 MMHG | BODY MASS INDEX: 25.76 KG/M2 | OXYGEN SATURATION: 95 %

## 2022-04-19 PROCEDURE — 93000 ELECTROCARDIOGRAM COMPLETE: CPT

## 2022-04-19 PROCEDURE — 99215 OFFICE O/P EST HI 40 MIN: CPT | Mod: 25

## 2022-04-19 NOTE — CARDIOLOGY SUMMARY
[de-identified] : 04/19/2022,  NSR [de-identified] : 4/11/2022, LV EF 60-65%, mild MR, normal LV diastolic function, mild TR, mild-moderate PI.\par 03/09/2020, Mild MR, moderately dilated LA LVEF 55%. [de-identified] : 4/11/2022, Carotid Duplex, non-obstructive disease.\par 4/9/2021, Carotid Duplex, non-obstructive disease

## 2022-04-19 NOTE — PHYSICAL EXAM
[Normal] : no edema, no cyanosis, no clubbing, no varicosities [Moves all extremities] : moves all extremities [de-identified] : No carotid artery bruits auscultated bilaterally [de-identified] : slow, shuffling gait

## 2022-04-19 NOTE — DISCUSSION/SUMMARY
[FreeTextEntry1] : 1. Carotid Artery Disease/Hx of TIA: carotid duplex performed 3/12/2019, revealed MERCED velocities suggestive of 50-69% stenosis, however vessel was tortuous as well. Patient underwent follow up carotid duplex, 03/09/2020. Showed about 50% bilateral internal carotid artery disease. Follow up echocardiogram, 4/11/2022, showed stable findings.  I recommend continuing statin therapy. Recommend Aspirin 81mg daily. Periodic carotid duplex surveillance.\par \par 2. HLD: continue statin therapy.\par \par 3. HTN: continue Toprol XL 25mg 0.5 tab daily (high risk medication with no signs of toxicity).\par \par 4. Mild Valvular Disease: patient underwent echocardiogram, 04/11/2022. No significant changes. Periodic echo surveillance. \par \par Follow up on 6 months.

## 2022-10-06 ENCOUNTER — NON-APPOINTMENT (OUTPATIENT)
Age: 87
End: 2022-10-06

## 2022-10-06 ENCOUNTER — APPOINTMENT (OUTPATIENT)
Dept: CARDIOLOGY | Facility: CLINIC | Age: 87
End: 2022-10-06

## 2022-10-06 VITALS
OXYGEN SATURATION: 99 % | WEIGHT: 176 LBS | BODY MASS INDEX: 26.67 KG/M2 | SYSTOLIC BLOOD PRESSURE: 132 MMHG | HEIGHT: 68 IN | HEART RATE: 81 BPM | DIASTOLIC BLOOD PRESSURE: 70 MMHG

## 2022-10-06 PROCEDURE — 99215 OFFICE O/P EST HI 40 MIN: CPT

## 2022-10-06 PROCEDURE — 93000 ELECTROCARDIOGRAM COMPLETE: CPT

## 2022-10-06 NOTE — PHYSICAL EXAM
[Normal] : no edema, no cyanosis, no clubbing, no varicosities [Moves all extremities] : moves all extremities [de-identified] : No carotid artery bruits auscultated bilaterally [de-identified] : slow, shuffling gait

## 2022-10-06 NOTE — CARDIOLOGY SUMMARY
[de-identified] : 10/6/2022,  NSR, normal ECG [de-identified] : 4/11/2022, LV EF 60-65%, mild MR, normal LV diastolic function, mild TR, mild-moderate PI.\par 03/09/2020, Mild MR, moderately dilated LA LVEF 55%. [de-identified] : 4/11/2022, Carotid Duplex, non-obstructive disease.\par 4/9/2021, Carotid Duplex, non-obstructive disease

## 2022-10-06 NOTE — DISCUSSION/SUMMARY
[FreeTextEntry1] : 1. Carotid Artery Disease/Hx of TIA: carotid duplex performed 3/12/2019, revealed MERCED velocities suggestive of 50-69% stenosis, however vessel was tortuous as well. Patient underwent follow up carotid duplex, 03/09/2020. Showed about 50% bilateral internal carotid artery disease. Follow up echocardiogram, 4/11/2022, showed stable findings.  I recommend continuing statin therapy. Recommend Aspirin 81mg daily. Periodic carotid duplex surveillance.\par \par 2. HLD: continue statin therapy.\par \par 3. HTN: continue Toprol XL 25mg 0.5 tab daily (high risk medication with no signs of toxicity).\par \par 4. Mild Valvular Disease: patient underwent echocardiogram, 04/11/2022. No significant changes. Periodic echo surveillance. \par \par Follow up on 6 months.  [EKG obtained to assist in diagnosis and management of assessed problem(s)] : EKG obtained to assist in diagnosis and management of assessed problem(s)

## 2022-10-19 ENCOUNTER — APPOINTMENT (OUTPATIENT)
Dept: CARDIOLOGY | Facility: CLINIC | Age: 87
End: 2022-10-19

## 2022-11-02 ENCOUNTER — OFFICE (OUTPATIENT)
Dept: URBAN - METROPOLITAN AREA CLINIC 8 | Facility: CLINIC | Age: 87
Setting detail: OPHTHALMOLOGY
End: 2022-11-02
Payer: MEDICARE

## 2022-11-02 DIAGNOSIS — H04.123: ICD-10-CM

## 2022-11-02 DIAGNOSIS — H35.033: ICD-10-CM

## 2022-11-02 DIAGNOSIS — H26.492: ICD-10-CM

## 2022-11-02 DIAGNOSIS — Z96.1: ICD-10-CM

## 2022-11-02 PROCEDURE — 92014 COMPRE OPH EXAM EST PT 1/>: CPT | Performed by: OPHTHALMOLOGY

## 2022-11-02 ASSESSMENT — REFRACTION_MANIFEST
OD_CYLINDER: -0.25
OU_VA: 20/20
OD_SPHERE: -0.25
OS_AXIS: 115
OS_SPHERE: -0.25
OS_VA2: 20/25
OD_ADD: +3.00
OD_AXIS: 075
OD_VA2: 20/20(J1+)
OS_ADD: +3.00
OS_CYLINDER: -0.25
OD_VA1: 20/25+2
OS_VA1: 20/20-2

## 2022-11-02 ASSESSMENT — REFRACTION_CURRENTRX
OS_OVR_VA: 20/
OS_VPRISM_DIRECTION: SV
OD_SPHERE: +2.50
OD_OVR_VA: 20/
OS_SPHERE: +3.00
OD_CYLINDER: SPH
OS_AXIS: 130
OD_VPRISM_DIRECTION: SV
OS_CYLINDER: -1.00

## 2022-11-02 ASSESSMENT — SPHEQUIV_DERIVED
OD_SPHEQUIV: -0.625
OS_SPHEQUIV: -0.625
OS_SPHEQUIV: -0.375
OD_SPHEQUIV: -0.375

## 2022-11-02 ASSESSMENT — KERATOMETRY
OS_AXISANGLE_DEGREES: 082
OD_AXISANGLE_DEGREES: 151
OD_K1POWER_DIOPTERS: 43.00
OD_K2POWER_DIOPTERS: 4.50
OS_K1POWER_DIOPTERS: 43.50
METHOD_AUTO_MANUAL: AUTO
OS_K2POWER_DIOPTERS: 44.00

## 2022-11-02 ASSESSMENT — CONFRONTATIONAL VISUAL FIELD TEST (CVF)
OS_FINDINGS: FULL
OD_FINDINGS: FULL

## 2022-11-02 ASSESSMENT — SUPERFICIAL PUNCTATE KERATITIS (SPK)
OD_SPK: T 1+
OS_SPK: T 1+

## 2022-11-02 ASSESSMENT — LID POSITION - DERMATOCHALASIS
OD_DERMATOCHALASIS: +2
OS_DERMATOCHALASIS: +2

## 2022-11-02 ASSESSMENT — VISUAL ACUITY
OS_BCVA: 20/25-
OD_BCVA: 20/25-

## 2022-11-02 ASSESSMENT — REFRACTION_AUTOREFRACTION
OS_CYLINDER: -0.75
OD_AXIS: 074
OS_AXIS: 116
OD_SPHERE: -0.25
OD_CYLINDER: -0.75
OS_SPHERE: -0.25

## 2022-11-02 ASSESSMENT — AXIALLENGTH_DERIVED
OD_AL: 34.3748
OD_AL: 34.5828
OS_AL: 23.6463
OS_AL: 23.7446

## 2022-11-05 NOTE — REASON FOR VISIT
show [Follow-Up - Clinic] : a clinic follow-up of [Carotid Artery Stenosis] : carotid stenosis [Medication Management] : Medication management

## 2023-04-06 ENCOUNTER — APPOINTMENT (OUTPATIENT)
Dept: CARDIOLOGY | Facility: CLINIC | Age: 88
End: 2023-04-06
Payer: MEDICARE

## 2023-04-06 ENCOUNTER — NON-APPOINTMENT (OUTPATIENT)
Age: 88
End: 2023-04-06

## 2023-04-06 VITALS
DIASTOLIC BLOOD PRESSURE: 64 MMHG | HEART RATE: 72 BPM | BODY MASS INDEX: 27.28 KG/M2 | HEIGHT: 68 IN | OXYGEN SATURATION: 99 % | WEIGHT: 180 LBS | SYSTOLIC BLOOD PRESSURE: 132 MMHG

## 2023-04-06 PROCEDURE — 93306 TTE W/DOPPLER COMPLETE: CPT

## 2023-04-06 PROCEDURE — 99214 OFFICE O/P EST MOD 30 MIN: CPT

## 2023-04-06 PROCEDURE — 93880 EXTRACRANIAL BILAT STUDY: CPT

## 2023-04-06 PROCEDURE — 93000 ELECTROCARDIOGRAM COMPLETE: CPT

## 2023-04-06 NOTE — PHYSICAL EXAM
[Normal] : no edema, no cyanosis, no clubbing, no varicosities [Moves all extremities] : moves all extremities [de-identified] : No carotid artery bruits auscultated bilaterally [de-identified] : slow, shuffling gait

## 2023-04-06 NOTE — DISCUSSION/SUMMARY
[FreeTextEntry1] : 1. Carotid Artery Disease/Hx of TIA: carotid duplex performed 3/12/2019, revealed MERCED velocities suggestive of 50-69% stenosis, however vessel was tortuous as well. Patient underwent follow up carotid duplex, 03/09/2020. Showed about 50% bilateral internal carotid artery disease. Follow up echocardiogram, 4/11/2022, showed stable findings.  I recommend continuing statin therapy. Recommend Aspirin 81mg daily in 2-3 weeks.  Periodic carotid duplex surveillance.\par \par 2. HLD: continue statin therapy.\par \par 3. HTN: continue Toprol XL 25mg 0.5 tab daily (high risk medication with no signs of toxicity).\par \par 4. Mild Valvular Disease: patient underwent echocardiogram, 04/11/2022. No significant changes. Periodic echo surveillance. \par \par Follow up on 6 months.  [EKG obtained to assist in diagnosis and management of assessed problem(s)] : EKG obtained to assist in diagnosis and management of assessed problem(s)

## 2023-04-06 NOTE — CARDIOLOGY SUMMARY
[de-identified] : 4/6/2023,  NSR, PAC [de-identified] : 4/11/2022, LV EF 60-65%, mild MR, normal LV diastolic function, mild TR, mild-moderate PI.\par 03/09/2020, Mild MR, moderately dilated LA LVEF 55%. [de-identified] : 4/11/2022, Carotid Duplex, non-obstructive disease.\par 4/9/2021, Carotid Duplex, non-obstructive disease

## 2023-04-06 NOTE — HISTORY OF PRESENT ILLNESS
[FreeTextEntry1] : Historical Perspective:\par This is an 86 year old male with history of HLD, carotid artery disease, HTN, TIA presents for follow up visit. He is uncertain on how he should be taking some of his medications. He is taking Aspirin 81mg two times a week and Plavix 75mg 2 times a week. He is taking rosuvastatin 5mg 0.5tab per night and Toprol XL 25mg 0.5 tab daily. \par \par Current Health Status:\par Patient with no chest pain, SOB, or palpitations.  Remains compliant with his medications and reports no adverse effects. Had GI bleed last weekend. Went to hospital. Temporarily stopped Aspirin. Will resume in 2-3 weeks. On PPI therapy.

## 2023-04-11 ENCOUNTER — NON-APPOINTMENT (OUTPATIENT)
Age: 88
End: 2023-04-11

## 2023-10-19 ENCOUNTER — APPOINTMENT (OUTPATIENT)
Dept: CARDIOLOGY | Facility: CLINIC | Age: 88
End: 2023-10-19
Payer: MEDICARE

## 2023-10-19 ENCOUNTER — NON-APPOINTMENT (OUTPATIENT)
Age: 88
End: 2023-10-19

## 2023-10-19 VITALS
OXYGEN SATURATION: 96 % | WEIGHT: 175 LBS | DIASTOLIC BLOOD PRESSURE: 60 MMHG | BODY MASS INDEX: 26.52 KG/M2 | SYSTOLIC BLOOD PRESSURE: 126 MMHG | HEART RATE: 77 BPM | HEIGHT: 68 IN

## 2023-10-19 DIAGNOSIS — G45.9 TRANSIENT CEREBRAL ISCHEMIC ATTACK, UNSPECIFIED: ICD-10-CM

## 2023-10-19 PROCEDURE — 99215 OFFICE O/P EST HI 40 MIN: CPT

## 2023-10-19 PROCEDURE — 93000 ELECTROCARDIOGRAM COMPLETE: CPT

## 2023-10-19 RX ORDER — PANTOPRAZOLE 40 MG/1
40 TABLET, DELAYED RELEASE ORAL DAILY
Refills: 0 | Status: DISCONTINUED | COMMUNITY
End: 2023-10-19

## 2024-04-18 ENCOUNTER — APPOINTMENT (OUTPATIENT)
Dept: CARDIOLOGY | Facility: CLINIC | Age: 89
End: 2024-04-18
Payer: MEDICARE

## 2024-04-18 VITALS
WEIGHT: 172 LBS | SYSTOLIC BLOOD PRESSURE: 118 MMHG | HEART RATE: 70 BPM | OXYGEN SATURATION: 96 % | DIASTOLIC BLOOD PRESSURE: 68 MMHG | HEIGHT: 68 IN | BODY MASS INDEX: 26.07 KG/M2

## 2024-04-18 DIAGNOSIS — Z79.899 OTHER LONG TERM (CURRENT) DRUG THERAPY: ICD-10-CM

## 2024-04-18 DIAGNOSIS — I34.0 NONRHEUMATIC MITRAL (VALVE) INSUFFICIENCY: ICD-10-CM

## 2024-04-18 DIAGNOSIS — I07.1 RHEUMATIC TRICUSPID INSUFFICIENCY: ICD-10-CM

## 2024-04-18 DIAGNOSIS — I77.9 DISORDER OF ARTERIES AND ARTERIOLES, UNSPECIFIED: ICD-10-CM

## 2024-04-18 PROCEDURE — 99215 OFFICE O/P EST HI 40 MIN: CPT

## 2024-04-18 PROCEDURE — 93306 TTE W/DOPPLER COMPLETE: CPT

## 2024-04-18 PROCEDURE — 93880 EXTRACRANIAL BILAT STUDY: CPT

## 2024-04-18 PROCEDURE — 93000 ELECTROCARDIOGRAM COMPLETE: CPT

## 2024-04-18 PROCEDURE — G2211 COMPLEX E/M VISIT ADD ON: CPT

## 2024-04-18 RX ORDER — ROSUVASTATIN CALCIUM 5 MG/1
5 TABLET, FILM COATED ORAL
Refills: 0 | Status: ACTIVE | COMMUNITY
Start: 2017-10-09

## 2024-04-18 NOTE — CARDIOLOGY SUMMARY
[de-identified] : 4/18/2024, NSR, PVC 4/6/2023,  NSR, PAC [de-identified] : 4/18/2024, LV EF 72%, mild MR, mild TR 4/11/2022, LV EF 60-65%, mild MR, normal LV diastolic function, mild TR, mild-moderate PI. 03/09/2020, Mild MR, moderately dilated LA LVEF 55%. [de-identified] : 4/18/2024, Carotid Duplex, non-obstructive disease. 4/11/2022, Carotid Duplex, non-obstructive disease. 4/9/2021, Carotid Duplex, non-obstructive disease

## 2024-04-18 NOTE — DISCUSSION/SUMMARY
[FreeTextEntry1] : 1. Carotid Artery Disease/Hx of TIA: carotid duplex performed 3/12/2019, revealed MERCED velocities suggestive of 50-69% stenosis, however vessel was tortuous as well. Patient underwent follow up carotid duplex, 03/09/2020. Showed about 50% bilateral internal carotid artery disease. Follow up echocardiogram, 4/11/2022, showed stable findings.  I recommend continuing statin therapy. Periodic carotid duplex surveillance.  2. HLD: continue statin therapy.  3. HTN: continue Toprol XL 25mg 0.5 tab daily (high risk medication with no signs of toxicity).  4. Mild Valvular Disease: patient underwent echocardiogram, 04/2024. No significant changes. Periodic echo surveillance.   Follow up on 6 months.  [EKG obtained to assist in diagnosis and management of assessed problem(s)] : EKG obtained to assist in diagnosis and management of assessed problem(s)

## 2024-04-18 NOTE — PHYSICAL EXAM
[Normal] : no edema, no cyanosis, no clubbing, no varicosities [Moves all extremities] : moves all extremities [de-identified] : No carotid artery bruits auscultated bilaterally [de-identified] : slow, shuffling gait

## 2024-04-18 NOTE — HISTORY OF PRESENT ILLNESS
[FreeTextEntry1] : Historical Perspective: This is an 88 year old male with history of HLD, carotid artery disease, HTN, TIA presents for follow up visit. He is uncertain on how he should be taking some of his medications. He is taking Aspirin 81mg two times a week and Plavix 75mg 2 times a week. He is taking rosuvastatin 5mg 0.5tab per night and Toprol XL 25mg 0.5 tab daily.   Current Health Status: Patient with no chest pain, SOB, or palpitations.  Remains compliant with his medications and reports no adverse effects.

## 2024-10-07 LAB — HBA1C MFR BLD HPLC: 5.9

## 2024-10-17 ENCOUNTER — NON-APPOINTMENT (OUTPATIENT)
Age: 89
End: 2024-10-17

## 2024-10-17 ENCOUNTER — APPOINTMENT (OUTPATIENT)
Dept: CARDIOLOGY | Facility: CLINIC | Age: 89
End: 2024-10-17
Payer: MEDICARE

## 2024-10-17 VITALS
HEIGHT: 68 IN | DIASTOLIC BLOOD PRESSURE: 58 MMHG | HEART RATE: 72 BPM | SYSTOLIC BLOOD PRESSURE: 110 MMHG | BODY MASS INDEX: 25.76 KG/M2 | OXYGEN SATURATION: 95 % | WEIGHT: 170 LBS

## 2024-10-17 DIAGNOSIS — I77.9 DISORDER OF ARTERIES AND ARTERIOLES, UNSPECIFIED: ICD-10-CM

## 2024-10-17 DIAGNOSIS — Z79.899 OTHER LONG TERM (CURRENT) DRUG THERAPY: ICD-10-CM

## 2024-10-17 DIAGNOSIS — G45.9 TRANSIENT CEREBRAL ISCHEMIC ATTACK, UNSPECIFIED: ICD-10-CM

## 2024-10-17 DIAGNOSIS — I34.0 NONRHEUMATIC MITRAL (VALVE) INSUFFICIENCY: ICD-10-CM

## 2024-10-17 PROCEDURE — 93000 ELECTROCARDIOGRAM COMPLETE: CPT

## 2024-10-17 PROCEDURE — 99214 OFFICE O/P EST MOD 30 MIN: CPT

## 2024-10-17 PROCEDURE — G2211 COMPLEX E/M VISIT ADD ON: CPT

## 2024-10-17 RX ORDER — CELECOXIB 200 MG/1
200 CAPSULE ORAL DAILY
Refills: 0 | Status: ACTIVE | COMMUNITY

## 2024-10-17 NOTE — DISCUSSION/SUMMARY
[FreeTextEntry1] : 1. Carotid Artery Disease/Hx of TIA: carotid duplex performed 3/12/2019, revealed RICHARD velocities suggestive of 50-69% stenosis, however vessel was tortuous as well. Patient underwent follow up carotid duplex, 03/09/2020. Showed about 50% bilateral internal carotid artery disease. Follow up echocardiogram, 4/11/2022, showed stable findings.  I recommend continuing statin therapy. Periodic carotid duplex surveillance.  2. HLD: continue statin therapy.  3. HTN: continue Toprol XL 25mg 0.5 tab daily (high risk medication with no signs of toxicity).  4. Mild Valvular Disease: patient underwent echocardiogram, 04/2024. No significant changes. Periodic echo surveillance.   Follow up on 6 months.  [EKG obtained to assist in diagnosis and management of assessed problem(s)] : EKG obtained to assist in diagnosis and management of assessed problem(s)

## 2024-10-17 NOTE — PHYSICAL EXAM
[Normal] : no edema, no cyanosis, no clubbing, no varicosities [Moves all extremities] : moves all extremities [de-identified] : No carotid artery bruits auscultated bilaterally [de-identified] : slow, shuffling gait

## 2024-10-17 NOTE — CARDIOLOGY SUMMARY
[de-identified] : 10/17/2024, NSR, normal ECG 4/18/2024, NSR, PVC 4/6/2023,  NSR, PAC [de-identified] : 4/18/2024, LV EF 72%, mild MR, mild TR 4/11/2022, LV EF 60-65%, mild MR, normal LV diastolic function, mild TR, mild-moderate PI. 03/09/2020, Mild MR, moderately dilated LA LVEF 55%. [de-identified] : 4/18/2024, Carotid Duplex, non-obstructive disease. 4/11/2022, Carotid Duplex, non-obstructive disease. 4/9/2021, Carotid Duplex, non-obstructive disease

## 2025-05-01 ENCOUNTER — APPOINTMENT (OUTPATIENT)
Dept: CARDIOLOGY | Facility: CLINIC | Age: 89
End: 2025-05-01
Payer: MEDICARE

## 2025-05-01 ENCOUNTER — NON-APPOINTMENT (OUTPATIENT)
Age: 89
End: 2025-05-01

## 2025-05-01 VITALS
SYSTOLIC BLOOD PRESSURE: 112 MMHG | HEIGHT: 68 IN | BODY MASS INDEX: 25.76 KG/M2 | OXYGEN SATURATION: 96 % | WEIGHT: 170 LBS | HEART RATE: 63 BPM | DIASTOLIC BLOOD PRESSURE: 60 MMHG

## 2025-05-01 DIAGNOSIS — I77.9 DISORDER OF ARTERIES AND ARTERIOLES, UNSPECIFIED: ICD-10-CM

## 2025-05-01 DIAGNOSIS — G45.9 TRANSIENT CEREBRAL ISCHEMIC ATTACK, UNSPECIFIED: ICD-10-CM

## 2025-05-01 DIAGNOSIS — I34.0 NONRHEUMATIC MITRAL (VALVE) INSUFFICIENCY: ICD-10-CM

## 2025-05-01 PROCEDURE — 93306 TTE W/DOPPLER COMPLETE: CPT

## 2025-05-01 PROCEDURE — 99214 OFFICE O/P EST MOD 30 MIN: CPT

## 2025-05-01 PROCEDURE — G2211 COMPLEX E/M VISIT ADD ON: CPT

## 2025-05-01 PROCEDURE — 93880 EXTRACRANIAL BILAT STUDY: CPT

## 2025-05-01 PROCEDURE — 93000 ELECTROCARDIOGRAM COMPLETE: CPT

## 2025-05-01 NOTE — HISTORY OF PRESENT ILLNESS
[FreeTextEntry1] : Historical Perspective: This is an 89 year old male with history of HLD, carotid artery disease, HTN, TIA presents for follow up visit. He is uncertain on how he should be taking some of his medications. He is taking Aspirin 81mg two times a week and Plavix 75mg 2 times a week. He is taking rosuvastatin 5mg 0.5tab per night and Toprol XL 25mg 0.5 tab daily.   Current Health Status: Patient with no chest pain, SOB, or palpitations.  Remains compliant with his medications and reports no adverse effects.

## 2025-05-01 NOTE — CARDIOLOGY SUMMARY
[de-identified] : 5/1/2025, NSR 10/17/2024, NSR, normal ECG 4/18/2024, NSR, PVC 4/6/2023,  NSR, PAC [de-identified] : 5/1/2025, LV EF 60%, mild-moderate MR, mild TR 4/18/2024, LV EF 72%, mild MR, mild TR 4/11/2022, LV EF 60-65%, mild MR, normal LV diastolic function, mild TR, mild-moderate PI. 03/09/2020, Mild MR, moderately dilated LA LVEF 55%. [de-identified] : 5/1/2025, Carotid Duplex: non-obstructive disease. 4/18/2024, Carotid Duplex, non-obstructive disease. 4/11/2022, Carotid Duplex, non-obstructive disease. 4/9/2021, Carotid Duplex, non-obstructive disease

## 2025-05-01 NOTE — DISCUSSION/SUMMARY
[FreeTextEntry1] : 1. Carotid Artery Disease/Hx of TIA: carotid duplex performed 3/12/2019, revealed RICHARD velocities suggestive of 50-69% stenosis, however vessel was tortuous as well. Patient underwent follow up carotid duplex, 03/09/2020. Showed about 50% bilateral internal carotid artery disease. Follow up echocardiogram, 4/11/2022, 5/1/2025, showed stable findings. Follow up carotid duplex, 5/1/2025, showed stable findings.  I recommend continuing statin therapy. Periodic carotid duplex surveillance.  2. HLD: continue statin therapy.  3. HTN: continue Toprol XL 25mg 0.5 tab daily.  4. Mild Valvular Disease: patient underwent echocardiogram, 04/2024. Stable echocardiogram, 5/1/2025. No significant changes. Periodic echo surveillance.   Follow up on 6 months.  [EKG obtained to assist in diagnosis and management of assessed problem(s)] : EKG obtained to assist in diagnosis and management of assessed problem(s)

## 2025-05-01 NOTE — PHYSICAL EXAM
[Normal] : no edema, no cyanosis, no clubbing, no varicosities [Moves all extremities] : moves all extremities [de-identified] : No carotid artery bruits auscultated bilaterally [de-identified] : slow, shuffling gait